# Patient Record
Sex: FEMALE | Race: WHITE | NOT HISPANIC OR LATINO | URBAN - METROPOLITAN AREA
[De-identification: names, ages, dates, MRNs, and addresses within clinical notes are randomized per-mention and may not be internally consistent; named-entity substitution may affect disease eponyms.]

---

## 2024-04-25 PROBLEM — Z01.419 WELL WOMAN EXAM WITH ROUTINE GYNECOLOGICAL EXAM: Status: ACTIVE | Noted: 2024-04-25

## 2024-04-25 NOTE — ASSESSMENT & PLAN NOTE
Pap is indicated at age 20. STD screen is offered.  Regular exercise and attaining/maintaining a healthy weight is encouraged.   Adequate calcium intake with diet or supplements is encouraged.    We will notify of any abnormal results.

## 2024-04-25 NOTE — PROGRESS NOTES
Subjective   Patient ID: Jordy De is a 18 y.o. female who presents for No chief complaint on file..  She presents as a new patient for gyn visit. Review of EMR shows she has been on OCP for contraception without issue.           Review of Systems    No past medical history on file.   Past Surgical History:   Procedure Laterality Date    OTHER SURGICAL HISTORY  07/30/2020    Knee surgery    OTHER SURGICAL HISTORY  08/11/2022    Oral surgery      Not on File   No current outpatient medications on file prior to visit.     No current facility-administered medications on file prior to visit.        Objective   Physical Exam      Problem List Items Addressed This Visit       Well woman exam with routine gynecological exam - Primary    Overview     She is on OCP for contraception.          Current Assessment & Plan     Pap is indicated at age 20. STD screen is offered.  Regular exercise and attaining/maintaining a healthy weight is encouraged.   Adequate calcium intake with diet or supplements is encouraged.    We will notify of any abnormal results.

## 2024-04-30 ENCOUNTER — APPOINTMENT (OUTPATIENT)
Dept: OBSTETRICS AND GYNECOLOGY | Facility: CLINIC | Age: 19
End: 2024-04-30

## 2025-08-10 ENCOUNTER — HOSPITAL ENCOUNTER (EMERGENCY)
Facility: HOSPITAL | Age: 20
Discharge: HOME | End: 2025-08-10
Payer: COMMERCIAL

## 2025-08-10 VITALS
BODY MASS INDEX: 30.24 KG/M2 | HEART RATE: 65 BPM | DIASTOLIC BLOOD PRESSURE: 62 MMHG | SYSTOLIC BLOOD PRESSURE: 124 MMHG | RESPIRATION RATE: 16 BRPM | OXYGEN SATURATION: 100 % | HEIGHT: 59 IN | TEMPERATURE: 97.2 F | WEIGHT: 150 LBS

## 2025-08-10 DIAGNOSIS — S65.513A LACERATION OF BLOOD VESSEL OF LEFT MIDDLE FINGER, INITIAL ENCOUNTER: Primary | ICD-10-CM
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
## 2025-08-10 PROCEDURE — 99281 EMR DPT VST MAYX REQ PHY/QHP: CPT

## 2025-08-10 RX ORDER — BACITRACIN 500 [USP'U]/G
OINTMENT TOPICAL ONCE
Status: DISCONTINUED | OUTPATIENT
Start: 2025-08-10 | End: 2025-08-10 | Stop reason: HOSPADM

## 2025-08-10 ASSESSMENT — PAIN - FUNCTIONAL ASSESSMENT: PAIN_FUNCTIONAL_ASSESSMENT: 0-10

## 2025-08-10 ASSESSMENT — PAIN SCALES - GENERAL: PAINLEVEL_OUTOF10: 0 - NO PAIN
